# Patient Record
(demographics unavailable — no encounter records)

---

## 2025-07-06 NOTE — DEVELOPMENTAL MILESTONES
[Plays and shares with others] : plays and shares with others [Put on coat, jacket, or shirt by self] : puts on coat, jacket, or shirt by self [Begins to play make-believe] : begins to play make-believe [Eats independently] : eats independently [Uses 3-word sentences] : uses 3-word sentences [Uses words that are 75% intelligible] : uses words that are 75% intelligible to strangers [Understands simple prepositions] : understands simple prepositions [Tells a story from a book or TV] : tells a story from a book or TV [Compares things using words such] : compares things using words such as bigger or shorter [Pedals tricycle] : pedals tricycle [Climbs on and off couch] : climbs on and off couch or chair [Jumps forward] : jumps forward [Draws a single [place]] : draws a single [place] [Draws a person with head] : draws a person with head and one other body part [Yes] : Completed. [Normal Development] : Normal Development [None] : none [Goes to the bathroom and urinates] : does not go to bathroom and urinates by self [FreeTextEntry1] : Plays appropriately with cousins and sister, talking in multi-word sentences, able to express wants and needs, not yet tried a scissor, currently starting potty training as patient is expressing interest, able to draw a [place] with a smiley face

## 2025-07-06 NOTE — PHYSICAL EXAM
[Alert] : alert [No Acute Distress] : no acute distress [Normocephalic] : normocephalic [Conjunctivae with no discharge] : conjunctivae with no discharge [PERRL] : PERRL [Clear Tympanic membranes with present light reflex and bony landmarks] : clear tympanic membranes with present light reflex and bony landmarks [EOMI Bilateral] : EOMI bilateral [No Discharge] : no discharge [Nares Patent] : nares patent [Uvula Midline] : uvula midline [Nonerythematous Oropharynx] : nonerythematous oropharynx [Trachea Midline] : trachea midline [Supple, full passive range of motion] : supple, full passive range of motion [No Palpable Masses] : no palpable masses [Symmetric Chest Rise] : symmetric chest rise [Clear to Auscultation Bilaterally] : clear to auscultation bilaterally [Regular Rate and Rhythm] : regular rate and rhythm [Normal S1, S2 present] : normal S1, S2 present [No Murmurs] : no murmurs [Soft] : soft [NonTender] : non tender [Non Distended] : non distended [Normoactive Bowel Sounds] : normoactive bowel sounds [Shai 1] : Shai 1 [No Gait Asymmetry] : no gait asymmetry [No pain or deformities with palpation of bone, muscles, joints] : no pain or deformities with palpation of bone, muscles, joints [Normal Muscle Tone] : normal muscle tone [Straight] : straight [Cranial Nerves Grossly Intact] : cranial nerves grossly intact [No Rash or Lesions] : no rash or lesions [FreeTextEntry1] : Pleasent and cooperative 3-year-old, chatting and interactive throughout exam [FreeTextEntry3] : Moderate non-obstructive cerumen in right ear, able to visualize TM [de-identified] : Moist mucous membranes, 1+ tonsils [de-identified] : No cervical LAD

## 2025-07-06 NOTE — DISCUSSION/SUMMARY
[Normal Growth] : growth [Normal Development] : development [No Elimination Concerns] : elimination [No Feeding Concerns] : feeding [No Skin Concerns] : skin [Normal Sleep Pattern] : sleep [Family Support] : family support [Encouraging Literacy Activities] : encouraging literacy activities [Playing with Peers] : playing with peers [Promoting Physical Activity] : promoting physical activity [Safety] : safety [Mother] : mother [] : The components of the vaccine(s) to be administered today are listed in the plan of care. The disease(s) for which the vaccine(s) are intended to prevent and the risks have been discussed with the caretaker.  The risks are also included in the appropriate vaccination information statements which have been provided to the patient's caregiver.  The caregiver has given consent to vaccinate. [No Medications] : ~He/She~ is not on any medications [de-identified] : Healthy weight by BMI [FreeTextEntry1] :  Dilma is a 2yo girl with no significant medical history who presents for WCC.   Patient is doing well.  Growing and gaining appropriately.  No acute concerns.  Age appropriate anticipatory guidance discussed.  #Established patient WCC - Discussed with Mother typical toddler eating patterns and that patient has no signs for concern given normal growth tracking for height and weight. Continue to offer nutritious meals, no concern for occasional low intake days at this time - Normal development, no developmental concerns - Hgb and lead normal last year - Next WC in 1 year

## 2025-07-06 NOTE — HISTORY OF PRESENT ILLNESS
[whole ___ oz/d] : consumes [unfilled] oz of whole cow's milk per day [Fruit] : fruit [Vegetables] : vegetables [Meat] : meat [Grains] : grains [Eggs] : eggs [Fish] : fish [Dairy] : dairy [___ stools per day] : [unfilled]  stools per day [Normal] : Normal [In bed] : In bed [Playtime (60 min/d)] : Playtime 60 min a day [< 2 hrs of screen time] : Less than 2 hrs of screen time [Appropiate parent-child communication] : Appropriate parent-child communication [Child given choices] : Child given choices [Child Cooperates] : Child cooperates [Yes] : Cigarette smoke exposure [Car seat in back seat] : Car seat in back seat [Smoke Detectors] : Smoke detectors [Supervised play near cars and streets] : Supervised play near cars and streets [Carbon Monoxide Detectors] : Carbon monoxide detectors [Up to date] : Up to date [NO] : No [Mother] : mother [Parent has appropriate responses to behavior] : Parent has appropriate responses to behavior [___ voids per day] : [unfilled] voids per day [Exposure to electronic nicotine delivery system] : No exposure to electronic nicotine delivery system [FreeTextEntry7] : No interval illnesses, ED/UC visits, or hospitalizations [de-identified] : 2-3x monthly days with poor PO intake compared to baseline, no abd pain, N/V/D. Generally, picky eater, typical toddler eating pattern, BM daily, voids normal [FreeTextEntry3] : 13hr overnight, 30min nap [de-identified] : Transitioning to using regular cup exclusively [LastFluorideTreatment] : 12/24 [de-identified] : Dentist every 6-months, brush teeth 2x daily [FreeTextEntry9] : Patient stays home with mom during the day, 12yo sister in school. [de-identified] : Dad smokes outside and practices smoking precautions before interacting with patient

## 2025-07-06 NOTE — DISCUSSION/SUMMARY
[Normal Growth] : growth [Normal Development] : development [No Elimination Concerns] : elimination [No Feeding Concerns] : feeding [No Skin Concerns] : skin [Normal Sleep Pattern] : sleep [Family Support] : family support [Encouraging Literacy Activities] : encouraging literacy activities [Playing with Peers] : playing with peers [Promoting Physical Activity] : promoting physical activity [Safety] : safety [Mother] : mother [] : The components of the vaccine(s) to be administered today are listed in the plan of care. The disease(s) for which the vaccine(s) are intended to prevent and the risks have been discussed with the caretaker.  The risks are also included in the appropriate vaccination information statements which have been provided to the patient's caregiver.  The caregiver has given consent to vaccinate. [No Medications] : ~He/She~ is not on any medications [de-identified] : Healthy weight by BMI [FreeTextEntry1] :  Dilma is a 4yo girl with no significant medical history who presents for WCC.   Patient is doing well.  Growing and gaining appropriately.  No acute concerns.  Age appropriate anticipatory guidance discussed.  #Established patient WCC - Discussed with Mother typical toddler eating patterns and that patient has no signs for concern given normal growth tracking for height and weight. Continue to offer nutritious meals, no concern for occasional low intake days at this time - Normal development, no developmental concerns - Hgb and lead normal last year - Next WC in 1 year

## 2025-07-06 NOTE — REVIEW OF SYSTEMS
[Negative] : Genitourinary [Vomiting] : no vomiting [Diarrhea] : no diarrhea [Constipation] : no constipation

## 2025-07-06 NOTE — DEVELOPMENTAL MILESTONES
[Plays and shares with others] : plays and shares with others [Put on coat, jacket, or shirt by self] : puts on coat, jacket, or shirt by self [Begins to play make-believe] : begins to play make-believe [Eats independently] : eats independently [Uses 3-word sentences] : uses 3-word sentences [Uses words that are 75% intelligible] : uses words that are 75% intelligible to strangers [Understands simple prepositions] : understands simple prepositions [Tells a story from a book or TV] : tells a story from a book or TV [Compares things using words such] : compares things using words such as bigger or shorter [Pedals tricycle] : pedals tricycle [Climbs on and off couch] : climbs on and off couch or chair [Jumps forward] : jumps forward [Draws a single [place]] : draws a single [place] [Draws a person with head] : draws a person with head and one other body part [Yes] : Completed. [Normal Development] : Normal Development [None] : none [Goes to the bathroom and urinates] : does not go to bathroom and urinates by self [FreeTextEntry1] : Plays appropriately with cousins and sister, talking in multi-word sentences, able to express wants and needs, not yet tried a scissor, currently starting potty training as patient is expressing interest, able to draw a [place] with a smiley face

## 2025-07-06 NOTE — HISTORY OF PRESENT ILLNESS
[whole ___ oz/d] : consumes [unfilled] oz of whole cow's milk per day [Fruit] : fruit [Vegetables] : vegetables [Meat] : meat [Grains] : grains [Eggs] : eggs [Fish] : fish [Dairy] : dairy [___ stools per day] : [unfilled]  stools per day [Normal] : Normal [In bed] : In bed [Playtime (60 min/d)] : Playtime 60 min a day [< 2 hrs of screen time] : Less than 2 hrs of screen time [Appropiate parent-child communication] : Appropriate parent-child communication [Child given choices] : Child given choices [Child Cooperates] : Child cooperates [Yes] : Cigarette smoke exposure [Car seat in back seat] : Car seat in back seat [Smoke Detectors] : Smoke detectors [Supervised play near cars and streets] : Supervised play near cars and streets [Carbon Monoxide Detectors] : Carbon monoxide detectors [Up to date] : Up to date [NO] : No [Mother] : mother [Parent has appropriate responses to behavior] : Parent has appropriate responses to behavior [___ voids per day] : [unfilled] voids per day [Exposure to electronic nicotine delivery system] : No exposure to electronic nicotine delivery system [FreeTextEntry7] : No interval illnesses, ED/UC visits, or hospitalizations [de-identified] : 2-3x monthly days with poor PO intake compared to baseline, no abd pain, N/V/D. Generally, picky eater, typical toddler eating pattern, BM daily, voids normal [FreeTextEntry3] : 13hr overnight, 30min nap [LastFluorideTreatment] : 12/24 [de-identified] : Transitioning to using regular cup exclusively [de-identified] : Dentist every 6-months, brush teeth 2x daily [FreeTextEntry9] : Patient stays home with mom during the day, 10yo sister in school. [de-identified] : Dad smokes outside and practices smoking precautions before interacting with patient

## 2025-07-06 NOTE — PHYSICAL EXAM
[Alert] : alert [No Acute Distress] : no acute distress [Normocephalic] : normocephalic [Conjunctivae with no discharge] : conjunctivae with no discharge [PERRL] : PERRL [Clear Tympanic membranes with present light reflex and bony landmarks] : clear tympanic membranes with present light reflex and bony landmarks [EOMI Bilateral] : EOMI bilateral [No Discharge] : no discharge [Nares Patent] : nares patent [Uvula Midline] : uvula midline [Nonerythematous Oropharynx] : nonerythematous oropharynx [Trachea Midline] : trachea midline [Supple, full passive range of motion] : supple, full passive range of motion [No Palpable Masses] : no palpable masses [Symmetric Chest Rise] : symmetric chest rise [Clear to Auscultation Bilaterally] : clear to auscultation bilaterally [Regular Rate and Rhythm] : regular rate and rhythm [Normal S1, S2 present] : normal S1, S2 present [No Murmurs] : no murmurs [Soft] : soft [NonTender] : non tender [Non Distended] : non distended [Normoactive Bowel Sounds] : normoactive bowel sounds [Shai 1] : Shai 1 [No Gait Asymmetry] : no gait asymmetry [No pain or deformities with palpation of bone, muscles, joints] : no pain or deformities with palpation of bone, muscles, joints [Normal Muscle Tone] : normal muscle tone [Straight] : straight [Cranial Nerves Grossly Intact] : cranial nerves grossly intact [No Rash or Lesions] : no rash or lesions [FreeTextEntry1] : Pleasent and cooperative 3-year-old, chatting and interactive throughout exam [FreeTextEntry3] : Moderate non-obstructive cerumen in right ear, able to visualize TM [de-identified] : Moist mucous membranes, 1+ tonsils [de-identified] : No cervical LAD